# Patient Record
Sex: FEMALE | Race: WHITE | NOT HISPANIC OR LATINO | Employment: OTHER | URBAN - METROPOLITAN AREA
[De-identification: names, ages, dates, MRNs, and addresses within clinical notes are randomized per-mention and may not be internally consistent; named-entity substitution may affect disease eponyms.]

---

## 2017-01-11 ENCOUNTER — GENERIC CONVERSION - ENCOUNTER (OUTPATIENT)
Dept: OTHER | Facility: OTHER | Age: 67
End: 2017-01-11

## 2017-01-11 ENCOUNTER — HOSPITAL ENCOUNTER (OUTPATIENT)
Dept: MAMMOGRAPHY | Facility: CLINIC | Age: 67
Discharge: HOME | End: 2017-01-11
Payer: MEDICARE

## 2017-01-11 DIAGNOSIS — Z12.31 ENCOUNTER FOR SCREENING MAMMOGRAM FOR MALIGNANT NEOPLASM OF BREAST: ICD-10-CM

## 2017-01-11 DIAGNOSIS — Z13.820 ENCOUNTER FOR SCREENING FOR OSTEOPOROSIS: ICD-10-CM

## 2017-01-11 PROCEDURE — 77080 DXA BONE DENSITY AXIAL: CPT

## 2017-01-11 PROCEDURE — G0202 SCR MAMMO BI INCL CAD: HCPCS

## 2017-01-12 ENCOUNTER — ALLSCRIPTS OFFICE VISIT (OUTPATIENT)
Dept: OTHER | Facility: OTHER | Age: 67
End: 2017-01-12

## 2017-01-12 ENCOUNTER — GENERIC CONVERSION - ENCOUNTER (OUTPATIENT)
Dept: OTHER | Facility: OTHER | Age: 67
End: 2017-01-12

## 2017-01-12 DIAGNOSIS — Z12.11 ENCOUNTER FOR SCREENING FOR MALIGNANT NEOPLASM OF COLON: ICD-10-CM

## 2017-01-12 DIAGNOSIS — I10 ESSENTIAL (PRIMARY) HYPERTENSION: ICD-10-CM

## 2017-01-12 DIAGNOSIS — E03.9 HYPOTHYROIDISM: ICD-10-CM

## 2017-03-02 ENCOUNTER — HOSPITAL ENCOUNTER (EMERGENCY)
Facility: HOSPITAL | Age: 67
Discharge: HOME | End: 2017-03-02
Attending: EMERGENCY MEDICINE | Admitting: EMERGENCY MEDICINE
Payer: MEDICARE

## 2017-03-02 VITALS
BODY MASS INDEX: 22.15 KG/M2 | TEMPERATURE: 97.8 F | OXYGEN SATURATION: 99 % | RESPIRATION RATE: 16 BRPM | SYSTOLIC BLOOD PRESSURE: 118 MMHG | HEIGHT: 63 IN | DIASTOLIC BLOOD PRESSURE: 57 MMHG | WEIGHT: 125 LBS | HEART RATE: 81 BPM

## 2017-03-02 DIAGNOSIS — R68.89 CONGESTION OF THROAT: Primary | ICD-10-CM

## 2017-03-02 PROCEDURE — 94760 N-INVAS EAR/PLS OXIMETRY 1: CPT

## 2017-03-02 PROCEDURE — 94664 DEMO&/EVAL PT USE INHALER: CPT

## 2017-03-02 PROCEDURE — 94668 MNPJ CHEST WALL SBSQ: CPT

## 2017-03-02 PROCEDURE — 94640 AIRWAY INHALATION TREATMENT: CPT

## 2017-03-02 PROCEDURE — 99284 EMERGENCY DEPT VISIT MOD MDM: CPT

## 2017-03-02 RX ORDER — LEVOTHYROXINE SODIUM 0.07 MG/1
75 TABLET ORAL DAILY
COMMUNITY

## 2017-03-02 RX ORDER — AMLODIPINE BESYLATE 5 MG/1
5 TABLET ORAL DAILY
COMMUNITY

## 2017-03-02 RX ORDER — IPRATROPIUM BROMIDE AND ALBUTEROL SULFATE 2.5; .5 MG/3ML; MG/3ML
3 SOLUTION RESPIRATORY (INHALATION)
Status: DISCONTINUED | OUTPATIENT
Start: 2017-03-02 | End: 2017-03-02 | Stop reason: HOSPADM

## 2017-03-02 RX ADMIN — IPRATROPIUM BROMIDE AND ALBUTEROL SULFATE 3 ML: .5; 3 SOLUTION RESPIRATORY (INHALATION) at 15:34

## 2018-01-11 NOTE — PROGRESS NOTES
Assessment    1  Initial Medicare annual wellness visit (V70 0) (Z00 00)   2  Visit for screening mammogram (V76 12) (Z12 31)   3  Screening for glaucoma (V80 1) (Z13 5)   4  Screening for osteoporosis (V82 81) (Z13 820)   5  Need for Tdap vaccination (V06 1) (Z23)    Plan  Initial Medicare annual wellness visit    · Eat a low fat and low cholesterol diet ; Status:Complete;   Done: 32DMI8378   · These are things you can do to prevent falls in and around the home ; Status:Complete;    Done: 63IBQ3468   · We recommend that you create an advance directive ; Status:Complete;   Done:  79ACM7627  Need for Tdap vaccination    · Boostrix 5-2 5-18 5 Intramuscular Suspension (Boostrix 5-2 5-18 5 Intramuscular  Suspension); INJECT 0 5  ML Intramuscular  Screening for glaucoma    · Crispin Us MD, Geovani Stacy (Ophthalmology) Physician Referral  Consult  Status: Hold For -  Scheduling  Requested for: 28DOT4947  Care Summary provided  : Yes  Visit for screening mammogram    · * MAMMO SCREENING BILATERAL W CAD; Status:Hold For - Scheduling; Requested  for:87Xjq3141;     Discussion/Summary    AWV completed  Mammo and DEXA ordered  Boostrix ordered  Referral to ophthalmology for glaucoma screening  Colonoscopy is up to date  Five Wishes packet provided  Advised her to complete and bring it back in  See problem visit for more details  Impression: Initial Annual Wellness Visit, with preventive exam as well as age and risk appropriate counseling completed  Cardiovascular screening and counseling: the risks and benefits of screening were discussed and screening is current  Diabetes screening and counseling: the risks and benefits of screening were discussed and screening is current  Colorectal cancer screening and counseling: the risks and benefits of screening were discussed and screening is current     Breast cancer screening and counseling: the risks and benefits of screening were discussed and due for a screening mammogram    Cervical cancer screening and counseling: the risks and benefits of screening were discussed and the patient declines screening  Osteoporosis screening and counseling: the risks and benefits of screening were discussed and due for DXA axial skeleton  Abdominal aortic aneurysm screening and counseling: the risks and benefits of screening were discussed and screening not indicated  Glaucoma screening and counseling: the risks and benefits of screening were discussed and ophthalmologist referral    HIV screening and counseling: the patient declines screening and screening not indicated  Immunizations: the risks and benefits of influenza vaccination were discussed with the patient, influenza vaccination is recommended annually, the risks and benefits of pneumococcal vaccination were discussed with the patient, the lifetime pneumococcal vaccine has been completed, the risks and benefits of the Zostavax vaccine were discussed with the patient, Zostavax vaccination up to date, the risks and benefits of the Tdap vaccine were discussed with the patient and Tdap vaccine needed today  Advance Directive Planning: not complete, paperwork and instructions were given to the patient  Chief Complaint  Patient is here for a Medicare Well Exam       History of Present Illness  Welcome to Medicare and Wellness Visits: The patient is being seen for the initial annual wellness visit  Medicare Screening and Risk Factors   Hospitalizations: no previous hospitalizations  Medicare Screening Tests Risk Questions   Abdominal aortic aneurysm risk assessment: none indicated  Osteoporosis risk assessment: , female gender and over 48years of age  HIV risk assessment: none indicated  Drug and Alcohol Use: The patient is a former cigarette smoker and quit smoking 5 YRS  The patient reports never drinking alcohol  She has never used illicit drugs     Diet and Physical Activity: Current diet includes CURRENTLY ON FEEDING TUBE  She exercises daily  Exercise: walking, WALKING DOG 60 minutes per day  Mood Disorder and Cognitive Impairment Screening: PHQ-9 Depression Scale   Cognitive impairment screening: denies difficulty learning/retaining new information and NO BARRIERS OF LEARNING  Functional Ability/Level of Safety: Hearing is normal bilaterally, normal in the right ear, normal in the left ear and a hearing aid is not used  The patient is currently able to do activities of daily living without limitations, able to participate in social activities without limitations and able to drive without limitations  Activities of daily living details: does not need help using the phone, no transportation help needed, does not need help shopping, no meal preparation help needed, does not need help doing housework, does not need help doing laundry, does not need help managing medications and does not need help managing money  Fall risk factors:  antihypertensive use, but The patient fell 0 times in the past 12 months , no polypharmacy, no alcohol use and no antidepressant use  Home safety risk factors:  NO SAFETY RISK FACTORS  , but no unfamiliar surroundings, no loose rugs, no poor household lighting, no uneven floors, no household clutter, grab bars in the bathroom and handrails on the stairs  Further Evaluation: PT IS SAFE @ HOME NO ONE HURTING HER  Advance Directives: Advance directives: no living will, no durable power of  for health care directives and no advance directives  end of life decisions were reviewed with the patient  Co-Managers and Medical Equipment/Suppliers: See Patient Care Team      Review of Systems    Constitutional: negative  Eyes: negative  ENT: negative  Cardiovascular: lower extremity edema  Respiratory: negative  Gastrointestinal: negative  Genitourinary: negative  Musculoskeletal: negative  Integumentary and Breasts: negative  Neurological: negative  Psychiatric: negative  Endocrine: negative  Hematologic and Lymphatic: negative  Over the past 2 weeks, how often have you been bothered by the following problems? 1 ) Little interest or pleasure in doing things? Not at all    2 ) Feeling down, depressed or hopeless? Not at all    3 ) Trouble falling asleep or sleeping too much? Several days  4 ) Feeling tired or having little energy? Not at all    5 ) Poor appetite or overeating? Not at all    6 ) Feeling bad about yourself, or that you are a failure, or have let yourself or your family down? Not at all    7 ) Trouble concentrating on things, such as reading a newspaper or watching television? Not at all    8 ) Moving or speaking so slowly that other people could have noticed, or the opposite, moving or speaking faster than usual? Not at all  How difficult have these problems made it for you to do your work, take care of things at home, or get along with people? 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)   · Negative depression screening    The active problems and past medical history were reviewed and updated today  Surgical History    · History of Ankle Surgery   · History of Percutaneous Placement Of Gastrostomy Tube   · History of Radiation Therapy    The surgical history was reviewed and updated today         Family History    · Family history of arthritis (V17 7) (Z82 61)   · Family history of hypothyroidism (V18 19) (Z83 49)    · Family history of arthritis (V17 7) (Z82 61)    The family history was reviewed and updated today  Social History    · Former smoker (Z45 12) (X69 693)  The social history was reviewed and updated today  Current Meds   1  AmLODIPine Besylate 10 MG Oral Tablet; take 1 tablet by mouth once daily; Therapy: 35GOX1440 to (Evaluate:00Ppu4687)  Requested for: 08PRQ5962; Last   Rx:08Mar2016 Ordered   2  Cholestyramine 4 GM Oral Packet; MIX THE CONTENTS OF 1 POWDER PACKET WITH   2-6 OZ OF NONCARBONATED BEVERAGE AND SWALLOW ONCE DAILY  Requested   for: 93HKO8724; Last Rx:57Ydi5391 Ordered   3  Levothyroxine Sodium 75 MCG Oral Tablet; take 1 tablet by mouth once daily; Therapy: 58WJL9884 to (Eldon Gutiérrez)  Requested for: 58BGE4017; Last   Rx:30Bbl9151 Ordered   4  Metoprolol Tartrate 50 MG Oral Tablet; take 1 tablet by mouth once daily; Therapy: 04FBP0365 to (Evaluate:25Apr2016)  Requested for: 462 9506; Last   Rx:16Vuh7518 Ordered   5  Osmolite 1 5 Camron Oral Liquid Recorded    The medication list was reviewed and updated today  Allergies    1   No Known Drug Allergies    Immunizations   1    Prevnar 13 Intramuscular Suspension  [id number]    Zoster  81USL1608     Signatures   Electronically signed by : Mason To MD; Jul 7 2016 12:06PM EST                       (Author)

## 2018-01-12 NOTE — RESULT NOTES
Message   DEXA shows osteopenia  Would recommend 1200 mg Calcium and 1000 units Vit D per day     Verified Results  * DXA BONE DENSITY SPINE HIP AND PELVIS 71RAO4710 03:10PM Estee Gama Order Number: [id number]     Test Name Result Flag Reference   DXA BONE DENSITY SPINE HIP AND PELVIS (Report)     CENTRAL DXA SCAN     CLINICAL HISTORY:  74year old post-menopausal  female risk factors include Previous smoking  Insulin-dependent diabetes  Throat carcinoma  Osteoporosis screening  TECHNIQUE: Bone densitometry was performed using a Hologic Horizon C bone densitometer  Regions of interest appear properly placed  There are no obvious fractures or other confounding variables which could limit the study  Degenerative changes of the    lumbar spine and hip  This will falsely elevate the bone mineral densities in these regions  COMPARISON: None  RESULTS:    LUMBAR SPINE: L1-L4:   BMD 0 788 gm/cm2   T-score -2 4   Z-score -0 3     LEFT TOTAL HIP:   BMD 0 745 gm/cm2   T-score -1 6   Z-score -2 0     LEFT FEMORAL NECK:   BMD 0 610 gm/cm2   T-score -2 2   Z-score -0 4             IMPRESSION:   1  Based on the [facility] classification, the T-score of -2 4 in the lumbar spine is consistent with low bone mineral density  2  The 10 year risk of hip fracture is 4 % with the 10 year risk of major osteoporotic fracture being 18 % as calculated by the WHO fracture risk assessment tool (FRAX)  The current NOF guidelines recommend treating patients with FRAX 10 year risk    score of >3% for hip fracture and >20% for major osteoporotic fracture  3  Any secondary causes of low bone mineral density should be excluded prior to treatment, if clinically indicated     4  A daily intake of at least 1200 mg calcium and 800 - 1000 IU of Vitamin D, as well as weight bearing and muscle strengthening exercise, fall prevention and avoidance of tobacco and excessive alcohol intake as basic preventive measures are suggested         WHO CLASSIFICATION:   Normal (a T-score of -1 0 or higher)   Low bone mineral density (a T-score of less than -1 0 but higher than -2 5)   Osteoporosis (a T-score of -2 5 or less)   Severe osteoporosis (a T-score of -2 5 or less with a fragility fracture)             Workstation performed: [id number]     Signed by:   Ross Floyd DO   1/12/17

## 2018-01-14 VITALS
HEIGHT: 62 IN | BODY MASS INDEX: 22.86 KG/M2 | OXYGEN SATURATION: 99 % | SYSTOLIC BLOOD PRESSURE: 116 MMHG | HEART RATE: 66 BPM | DIASTOLIC BLOOD PRESSURE: 64 MMHG | TEMPERATURE: 97.6 F | WEIGHT: 124.2 LBS

## 2018-01-16 NOTE — RESULT NOTES
Message   mammogram normal     Verified Results  * MAMMO SCREENING BILATERAL W CAD [id number] 03:10PM Jocelyn Jacome Order Number: [id number]     Test Name Result Flag Reference   MAMMO SCREENING BILATERAL W CAD (Report)     Patient History:   Patient is postmenopausal and has history of other cancer at age    75  Family history of breast cancer in maternal grandmother at age    61  Patient is a former smoker, and smoked for 35 years  Patient's    BMI is 26 6  Reason for exam: screening (asymptomatic)  Mammo Screening Bilateral W CAD: January 11, 2017 - Check In #:    [de-identified]   Bilateral CC and MLO view(s) were taken  Technologist: ADILENE Narvaez (R)(M)   Prior study comparison: August 6, 2015, bilateral [address]  [facility] mammo w/CAD performed at [place] [address]  There are scattered fibroglandular densities  No dominant soft tissue mass, architectural distortion or    suspicious calcifications are noted in either breast   The skin    and nipple structures are within normal limits  Scattered benign   appearing calcifications are noted  No mammographic evidence of malignancy  No significant changes when compared with prior studies  ASSESSMENT: BiRad:2 - Benign     Recommendation:   Routine screening mammogram of both breasts in 1 year  A    reminder letter will be scheduled  Analyzed by CAD     8-10% of cancers will be missed on mammography  Management of a    palpable abnormality must be based on clinical grounds  Patients   will be notified of their results via letter from our facility  Accredited by [employer] of [employer] and FDA       Transcription Location: ADILENE Bingham 98: [id number]     Risk Value(s):   Tyrer-Cuzick 10 Year: 4 298%, Tyrer-Cuzick Lifetime: 7 726%,    Myriad Table: 1 5%, MIGUEL 5 Year: 1 9%, NCI Lifetime: 6 2%   Signed by:   Sharri Dodge MD   1/11/17

## 2018-01-18 NOTE — RESULT NOTES
Verified Results  (1) CBC/PLT/DIFF [id number] 09:39AM Rosena Hatchet     Test Name Result Flag Reference   WBC COUNT 9 53 Thousand/uL  4 31-10 16   RBC COUNT 4 27 Million/uL  3 81-5 12   HEMOGLOBIN 12 3 g/dL  11 5-15 4   HEMATOCRIT 38 4 %  34 8-46  1   MCV 90 fL  82-98   MCH 28 8 pg  26 8-34 3   MCHC 32 0 g/dL  31 4-37 4   RDW 14 3 %  11 6-15 1   MPV 12 1 fL  8 9-12 7   PLATELET COUNT 402 Thousands/uL  149-390   nRBC AUTOMATED 0 /100 WBCs     NEUTROPHILS RELATIVE PERCENT 72 %  43-75   LYMPHOCYTES RELATIVE PERCENT 14 %  14-44   MONOCYTES RELATIVE PERCENT 12 %  4-12   EOSINOPHILS RELATIVE PERCENT 2 %  0-6   BASOPHILS RELATIVE PERCENT 0 %  0-1   NEUTROPHILS ABSOLUTE COUNT 6 82 Thousands/?L  1 85-7 62   LYMPHOCYTES ABSOLUTE COUNT 1 36 Thousands/?L  0 60-4 47   MONOCYTES ABSOLUTE COUNT 1 18 Thousand/?L  0 17-1 22   EOSINOPHILS ABSOLUTE COUNT 0 14 Thousand/?L  0 00-0 61   BASOPHILS ABSOLUTE COUNT 0 02 Thousands/?L  0 00-0 10     (1) COMPREHENSIVE METABOLIC PANEL 10TTY2159 55:05PZ Rosena Hatchet     Test Name Result Flag Reference   GLUCOSE,RANDM 80 mg/dL     If the patient is fasting, the ADA then defines impaired fasting glucose as > 100 mg/dL and diabetes as > or equal to 123 mg/dL  SODIUM 137 mmol/L  136-145   POTASSIUM 3 9 mmol/L  3 5-5 3   CHLORIDE 103 mmol/L  100-108   CARBON DIOXIDE 28 mmol/L  21-32   ANION GAP (CALC) 6 mmol/L  4-13   BLOOD UREA NITROGEN 28 mg/dL H 5-25   CREATININE 1 42 mg/dL H 0 60-1 30   Standardized to IDMS reference method   CALCIUM 10 2 mg/dL H 8 3-10 1   BILI, TOTAL 0 63 mg/dL  0 20-1 00   ALK PHOSPHATAS 112 U/L     ALT (SGPT) 30 U/L  12-78   AST(SGOT) 16 U/L  5-45   ALBUMIN 4 0 g/dL  3 5-5 0   TOTAL PROTEIN 8 1 g/dL  6 4-8 2   eGFR Non-African American 36 8 ml/min/1 73sq [facility] Disease Education Program recommendations are as follows:  GFR calculation is accurate only with a steady state creatinine  Chronic Kidney disease less than 60 ml/min/1 73 sq  meters  Kidney failure less than 15 ml/min/1 73 sq  meters  (1) LIPID PANEL FASTING W DIRECT LDL REFLEX [id number] 09:39AM Jonathan Cho     Test Name Result Flag Reference   CHOLESTEROL 181 mg/dL     LDL CHOLESTEROL CALCULATED 93 mg/dL  0-100   Triglyceride:         Normal              <150 mg/dl       Borderline High    150-199 mg/dl       High               200-499 mg/dl       Very High          >499 mg/dl  Cholesterol:         Desirable        <200 mg/dl      Borderline High  200-239 mg/dl      High             >239 mg/dl  HDL Cholesterol:        High    >59 mg/dL      Low     <41 mg/dL  LDL Cholesterol:        Optimal          <100 mg/dl        Near Optimal     100-129 mg/dl        Above Optimal          Borderline High   130-159 mg/dl          High              160-189 mg/dl          Very High        >189 mg/dl  LDL CALCULATED:    This screening LDL is a calculated result  It does not have the accuracy of the Direct Measured LDL in the monitoring of patients with hyperlipidemia and/or statin therapy  Direct Measure LDL (EXK938) must be ordered separately in these patients  TRIGLYCERIDES 135 mg/dL  <=150   Specimen collection should occur prior to N-Acetylcysteine or Metamizole administration due to the potential for falsely depressed results  HDL,DIRECT 61 mg/dL H 40-60   Specimen collection should occur prior to Metamizole administration due to the potential for falsely depressed results  (1) TSH 80IRA7178 09:39AM Jonathan Cho     Test Name Result Flag Reference   TSH 3 170 uIU/mL  0 358-3 740   Patients undergoing fluorescein dye angiography may retain small amounts of fluorescein in the body for 48-72 hours post procedure  Samples containing fluorescein can produce falsely depressed TSH values  If the patient had this procedure,a specimen should be resubmitted post fluorescein clearance            The recommended reference ranges for TSH during pregnancy are as follows:  First trimester 0 1 to 2 5 uIU/mL  Second trimester  0 2 to 3 0 uIU/mL  Third trimester 0 3 to 3 0 uIU/m